# Patient Record
(demographics unavailable — no encounter records)

---

## 2025-01-30 NOTE — HISTORY OF PRESENT ILLNESS
[de-identified] : This is a 1 yo female here for follow-up of constipation and infrequent stools.  She is accompanied today by her gr grandmother she was full-term.  Per the EMR she passed meconium in the first 24 hours and then several times before discharge.  Per the initial visit she was stooling soft and regularly until about a year ago and symptoms got worse over the last few months.  She is she was having hard large painful stools every 2 to 3 days associated with crying and discomfort.  There was no blood in the stool.  She was started on MiraLAX but then stopped drinking it and refusing to take it  She is here for follow-up. She was sick the last week with the flu and was running high fevers and had a febrile seizure (which she has had before), Went to [facility] and was not happy the ER. Went to [facility] ER and tested pos for flu.  She always has a stomach ache and has tummy issues, not eating well. She has been on the ex-lax, If she goes 3-4 days with loose stool then they stop the Ex-Lax and she stopped stooling.  Leo reports the mom is uncomfortable with her being on daily Ex-Lax and would like her to not be on any medications.  Leo reports that she was stooling generally every day on half a square of Ex-Lax and went on 1 square Ex-Lax the stool was too loose.  We spoke with mom on the phone during the visit and mom says she is getting 1 square of Ex-Lax daily and not stooling daily and that stools are hard.  Per grandmoblanca who lives with [facility] and the last stool was Friday. Got ex-lax Tuesday and today.  She is on 1/2 sq ex-lax at baseline. Then will skip a few days she has a whole sq..  Good growth and  Had a long discussion with mom on the phone about chronic constipation with stool withholding and that is important for her to be on daily stooling medications.  She had normal meconium passage and her symptoms are consistent with stool withholding.  Without medication she will continue to likely still withhold and develop further problems with defecation.  We discussed that imaging such as x-rays and ultrasounds are typically not helpful in the management of constipation

## 2025-01-30 NOTE — PHYSICAL EXAM
[Well Developed] : well developed [Well Nourished] : well nourished [NAD] : in no acute distress [PERRL] : pupils were equal, round, reactive to light  [Moist & Pink Mucous Membranes] : moist and pink mucous membranes [Normal Oropharynx] : the oropharynx was normal [CTAB] : lungs clear to auscultation bilaterally [Regular Rate and Rhythm] : regular rate and rhythm [Normal S1, S2] : normal S1 and S2 [Soft] : soft  [Normal Bowel Sounds] : normal bowel sounds [No HSM] : no hepatosplenomegaly appreciated [Rectal Exam Deferred] : rectal exam was deferred [Normal Tone] : normal tone [Well-Perfused] : well-perfused [Interactive] : interactive [Appropriate Affect] : appropriate affect [Appropriate Behavior] : appropriate behavior [icteric] : anicteric [Oral Ulcers] : no oral ulcers [Respiratory Distress] : no respiratory distress  [Wheeze] : no wheezing  [Murmur] : no murmur [Distended] : non distended [Tender] : non tender [Stool Palpable] : no stool palpable [Mass ___ cm] : no masses were palpated [Edema] : no edema [Cyanosis] : no cyanosis [Rash] : no rash [Jaundice] : no jaundice

## 2025-01-30 NOTE — ASSESSMENT
[FreeTextEntry1] : 2-year-old female with history of normal meconium passage here for evaluation of infrequent large hard stools.  She has a history of significant pain and discomfort with stooling and has stool withholding behavior.  She responded very well to a cap of MiraLAX daily though now refuses to take it and is now on Ex-Lax though taking it inconsistently and with mixed results.  We had a long discussion with the importance of daily laxative usage and stool withholding so that she begins having soft daily stools.  Recommend: -Continue daily Ex-Lax, recommend increasing to 2 squares per day and titrating to yield soft daily stools.  We had a long discussion about how to titrate Ex-Lax -Increase fiber and fluids in the diet -Labs to do if not improving - Family instructed to call if any questions/concerns, recommend they set up a follow-up visit in 3 months and when she is doing well we will start weaning her to fiber

## 2025-01-30 NOTE — CONSULT LETTER
[Dear  ___] : Dear  [unfilled], [Courtesy Letter:] : I had the pleasure of seeing your patient, [unfilled], in my office today. [Please see my note below.] : Please see my note below. [Consult Closing:] : Thank you very much for allowing me to participate in the care of this patient.  If you have any questions, please do not hesitate to contact me. [Sincerely,] : Sincerely, [FreeTextEntry3] : Esperanza Forbes MD Attending Physician Pediatric Gastroenterology and Nutrition

## 2025-01-30 NOTE — HISTORY OF PRESENT ILLNESS
[de-identified] : This is a 1 yo female here for follow-up of constipation and infrequent stools.  She is accompanied today by her gr grandmother she was full-term.  Per the EMR she passed meconium in the first 24 hours and then several times before discharge.  Per the initial visit she was stooling soft and regularly until about a year ago and symptoms got worse over the last few months.  She is she was having hard large painful stools every 2 to 3 days associated with crying and discomfort.  There was no blood in the stool.  She was started on MiraLAX but then stopped drinking it and refusing to take it  She is here for follow-up. She was sick the last week with the flu and was running high fevers and had a febrile seizure (which she has had before), Went to [facility] and was not happy the ER. Went to [facility] ER and tested pos for flu.  She always has a stomach ache and has tummy issues, not eating well. She has been on the ex-lax, If she goes 3-4 days with loose stool then they stop the Ex-Lax and she stopped stooling.  Leo reports the mom is uncomfortable with her being on daily Ex-Lax and would like her to not be on any medications.  Leo reports that she was stooling generally every day on half a square of Ex-Lax and went on 1 square Ex-Lax the stool was too loose.  We spoke with mom on the phone during the visit and mom says she is getting 1 square of Ex-Lax daily and not stooling daily and that stools are hard.  Per grandmoblanca who lives with [facility] and the last stool was Friday. Got ex-lax Tuesday and today.  She is on 1/2 sq ex-lax at baseline. Then will skip a few days she has a whole sq..  Good growth and  Had a long discussion with mom on the phone about chronic constipation with stool withholding and that is important for her to be on daily stooling medications.  She had normal meconium passage and her symptoms are consistent with stool withholding.  Without medication she will continue to likely still withhold and develop further problems with defecation.  We discussed that imaging such as x-rays and ultrasounds are typically not helpful in the management of constipation